# Patient Record
Sex: FEMALE | Race: WHITE | NOT HISPANIC OR LATINO
[De-identification: names, ages, dates, MRNs, and addresses within clinical notes are randomized per-mention and may not be internally consistent; named-entity substitution may affect disease eponyms.]

---

## 2018-11-08 PROBLEM — Z00.00 ENCOUNTER FOR PREVENTIVE HEALTH EXAMINATION: Status: ACTIVE | Noted: 2018-11-08

## 2018-12-24 ENCOUNTER — APPOINTMENT (OUTPATIENT)
Dept: OTOLARYNGOLOGY | Facility: CLINIC | Age: 53
End: 2018-12-24

## 2022-06-09 ENCOUNTER — NON-APPOINTMENT (OUTPATIENT)
Age: 57
End: 2022-06-09

## 2022-06-27 ENCOUNTER — APPOINTMENT (OUTPATIENT)
Dept: CARDIOTHORACIC SURGERY | Facility: CLINIC | Age: 57
End: 2022-06-27

## 2022-06-27 VITALS — BODY MASS INDEX: 35 KG/M2 | HEIGHT: 64 IN | WEIGHT: 205 LBS

## 2022-06-27 DIAGNOSIS — Z80.1 FAMILY HISTORY OF MALIGNANT NEOPLASM OF TRACHEA, BRONCHUS AND LUNG: ICD-10-CM

## 2022-06-27 DIAGNOSIS — Z87.891 PERSONAL HISTORY OF NICOTINE DEPENDENCE: ICD-10-CM

## 2022-06-27 PROCEDURE — G0296 VISIT TO DETERM LDCT ELIG: CPT

## 2022-06-28 NOTE — REASON FOR VISIT
[Initial Evaluation] : an initial evaluation visit [Review of Eligibility] : review of eligibility [Low-Dose CT Screening Discussion] : low-dose CT lung cancer screening discussion [Virtual Visit] : virtual visit [FreeTextEntry1] : Baseline LDCT

## 2022-07-05 ENCOUNTER — OUTPATIENT (OUTPATIENT)
Dept: OUTPATIENT SERVICES | Facility: HOSPITAL | Age: 57
LOS: 1 days | Discharge: HOME | End: 2022-07-05

## 2022-07-05 DIAGNOSIS — E78.00 PURE HYPERCHOLESTEROLEMIA, UNSPECIFIED: ICD-10-CM

## 2022-07-05 PROCEDURE — 71271 CT THORAX LUNG CANCER SCR C-: CPT | Mod: 26

## 2022-07-07 ENCOUNTER — NON-APPOINTMENT (OUTPATIENT)
Age: 57
End: 2022-07-07

## 2022-07-11 ENCOUNTER — TRANSCRIPTION ENCOUNTER (OUTPATIENT)
Age: 57
End: 2022-07-11

## 2022-07-14 ENCOUNTER — OUTPATIENT (OUTPATIENT)
Dept: OUTPATIENT SERVICES | Facility: HOSPITAL | Age: 57
LOS: 1 days | Discharge: HOME | End: 2022-07-14

## 2022-07-14 DIAGNOSIS — R07.9 CHEST PAIN, UNSPECIFIED: ICD-10-CM

## 2022-07-14 PROCEDURE — 75574 CT ANGIO HRT W/3D IMAGE: CPT | Mod: 26

## 2022-10-07 ENCOUNTER — OUTPATIENT (OUTPATIENT)
Dept: OUTPATIENT SERVICES | Facility: HOSPITAL | Age: 57
LOS: 1 days | Discharge: HOME | End: 2022-10-07

## 2022-10-07 DIAGNOSIS — E78.00 PURE HYPERCHOLESTEROLEMIA, UNSPECIFIED: ICD-10-CM

## 2022-10-07 PROCEDURE — 71250 CT THORAX DX C-: CPT | Mod: 26

## 2022-11-11 ENCOUNTER — OUTPATIENT (OUTPATIENT)
Dept: OUTPATIENT SERVICES | Facility: HOSPITAL | Age: 57
LOS: 1 days | Discharge: HOME | End: 2022-11-11

## 2022-11-11 DIAGNOSIS — R93.5 ABNORMAL FINDINGS ON DIAGNOSTIC IMAGING OF OTHER ABDOMINAL REGIONS, INCLUDING RETROPERITONEUM: ICD-10-CM

## 2022-11-11 LAB — GLUCOSE BLDC GLUCOMTR-MCNC: 92 MG/DL — SIGNIFICANT CHANGE UP (ref 70–99)

## 2022-11-11 PROCEDURE — 78815 PET IMAGE W/CT SKULL-THIGH: CPT | Mod: 26,PI

## 2023-01-09 ENCOUNTER — APPOINTMENT (OUTPATIENT)
Age: 58
End: 2023-01-09
Payer: COMMERCIAL

## 2023-01-09 VITALS
HEIGHT: 64 IN | WEIGHT: 201 LBS | OXYGEN SATURATION: 99 % | DIASTOLIC BLOOD PRESSURE: 80 MMHG | HEART RATE: 90 BPM | SYSTOLIC BLOOD PRESSURE: 124 MMHG | BODY MASS INDEX: 34.31 KG/M2

## 2023-01-09 PROCEDURE — 99205 OFFICE O/P NEW HI 60 MIN: CPT

## 2023-02-09 ENCOUNTER — LABORATORY RESULT (OUTPATIENT)
Age: 58
End: 2023-02-09

## 2023-02-09 ENCOUNTER — APPOINTMENT (OUTPATIENT)
Dept: HEMATOLOGY ONCOLOGY | Facility: CLINIC | Age: 58
End: 2023-02-09

## 2023-02-09 ENCOUNTER — OUTPATIENT (OUTPATIENT)
Dept: OUTPATIENT SERVICES | Facility: HOSPITAL | Age: 58
LOS: 1 days | End: 2023-02-09
Payer: COMMERCIAL

## 2023-02-09 ENCOUNTER — APPOINTMENT (OUTPATIENT)
Dept: HEMATOLOGY ONCOLOGY | Facility: CLINIC | Age: 58
End: 2023-02-09
Payer: COMMERCIAL

## 2023-02-09 VITALS
DIASTOLIC BLOOD PRESSURE: 80 MMHG | WEIGHT: 196 LBS | TEMPERATURE: 98.1 F | OXYGEN SATURATION: 98 % | RESPIRATION RATE: 14 BRPM | HEIGHT: 64 IN | BODY MASS INDEX: 33.46 KG/M2 | SYSTOLIC BLOOD PRESSURE: 144 MMHG | HEART RATE: 99 BPM

## 2023-02-09 DIAGNOSIS — C34.90 MALIGNANT NEOPLASM OF UNSPECIFIED PART OF UNSPECIFIED BRONCHUS OR LUNG: ICD-10-CM

## 2023-02-09 PROCEDURE — 99204 OFFICE O/P NEW MOD 45 MIN: CPT

## 2023-02-09 PROCEDURE — 82728 ASSAY OF FERRITIN: CPT

## 2023-02-09 PROCEDURE — 80053 COMPREHEN METABOLIC PANEL: CPT

## 2023-02-09 PROCEDURE — 85027 COMPLETE CBC AUTOMATED: CPT

## 2023-02-10 DIAGNOSIS — C34.90 MALIGNANT NEOPLASM OF UNSPECIFIED PART OF UNSPECIFIED BRONCHUS OR LUNG: ICD-10-CM

## 2023-02-10 LAB
ALBUMIN SERPL ELPH-MCNC: 4.7 G/DL
ALP BLD-CCNC: 117 U/L
ALT SERPL-CCNC: 22 U/L
ANION GAP SERPL CALC-SCNC: 9 MMOL/L
AST SERPL-CCNC: 21 U/L
BILIRUB SERPL-MCNC: <0.2 MG/DL
BUN SERPL-MCNC: 15 MG/DL
CALCIUM SERPL-MCNC: 9.7 MG/DL
CHLORIDE SERPL-SCNC: 100 MMOL/L
CO2 SERPL-SCNC: 28 MMOL/L
CREAT SERPL-MCNC: 0.9 MG/DL
EGFR: 74 ML/MIN/1.73M2
FERRITIN SERPL-MCNC: 64 NG/ML
GLUCOSE SERPL-MCNC: 89 MG/DL
HCT VFR BLD CALC: 37.8 %
HGB BLD-MCNC: 12.1 G/DL
MCHC RBC-ENTMCNC: 27.9 PG
MCHC RBC-ENTMCNC: 32 G/DL
MCV RBC AUTO: 87.1 FL
PLATELET # BLD AUTO: 368 K/UL
PMV BLD: 8.9 FL
POTASSIUM SERPL-SCNC: 4.6 MMOL/L
PROT SERPL-MCNC: 7.8 G/DL
RBC # BLD: 4.34 M/UL
RBC # FLD: 13 %
SODIUM SERPL-SCNC: 137 MMOL/L
WBC # FLD AUTO: 9.11 K/UL

## 2023-02-13 NOTE — ASSESSMENT
[FreeTextEntry1] : 58 year old former smoker with early stage non-small cell lung cancer ( adenocarcinoma , grade 2 )                        [id number] ) PDL1<1\par s/p left upper lobectomy . no adverse pathologic features.\par Plan : Natural history , prognosis and surveillance of resected early stage lung cancer  was discussed at length . \par There is no proven  benefit of adjuvant therapy in stage 1 disease in the absence of adverse clinical or pathologic features. \par she will return in 6 months with repeat CT scan .\par All questions and concerns were addressed.

## 2023-02-13 NOTE — HISTORY OF PRESENT ILLNESS
[de-identified] : 58 year old female , former smoker ( 25 pack year ) , goiter, found on screening CT scan in 7/2022  with BARBARA nodule 1.2 cm and a 2 mm BARBARA ground glass opacity . Follow up CT in 10/2022 showed increase in nodule to 1.5 cm . PET scan showed no increase in SUV . \par On 12/6/2022 she underwent Left upper lobectomy for 8 mm mass composed of adenocarcinoma , predominantly acinar , TTF 1 positive , PDL1<1 no visceral , pleural involvement or LVI , negative peribronchial lymph nodes . \par since surgery she complains of mild exertional dyspnea. no cough , bone pain , headaches . \par Health maintenance : up to date with mammogram and colonoscopy .

## 2023-02-17 ENCOUNTER — NON-APPOINTMENT (OUTPATIENT)
Age: 58
End: 2023-02-17

## 2023-02-27 ENCOUNTER — APPOINTMENT (OUTPATIENT)
Dept: PULMONOLOGY | Facility: CLINIC | Age: 58
End: 2023-02-27
Payer: COMMERCIAL

## 2023-02-27 VITALS
HEIGHT: 64 IN | OXYGEN SATURATION: 97 % | DIASTOLIC BLOOD PRESSURE: 84 MMHG | HEART RATE: 82 BPM | WEIGHT: 200 LBS | SYSTOLIC BLOOD PRESSURE: 112 MMHG | BODY MASS INDEX: 34.15 KG/M2

## 2023-02-27 PROCEDURE — 99214 OFFICE O/P EST MOD 30 MIN: CPT

## 2023-02-27 RX ORDER — ALBUTEROL SULFATE 90 UG/1
108 (90 BASE) INHALANT RESPIRATORY (INHALATION)
Qty: 1 | Refills: 1 | Status: ACTIVE | COMMUNITY
Start: 2023-02-27 | End: 1900-01-01

## 2023-03-01 NOTE — DISCUSSION/SUMMARY
[FreeTextEntry1] : Patient is here for follow-up.  She is doing fairly well.  She uses albuterol about 1 time per day.  Very upset about going to work does not feel she can do anything heavy which is reasonable I think given her a lobectomy at her age some of her request for time off a very reasonable especially since she does a lot of heavy lifting and works a lot of extra hours at times.\par \par She is awake alert oriented.  Her skin is warm and dry.  Her wounds continue to heal well.  Her chest is clear.  There is no clubbing cyanosis or edema.\par \par At this point Dr. Amador is following her and checking on the scans.  Her histology was adenocarcinoma.  I agree with the fact that she does not need adjuvant therapy.  I told her that a pulmonary function test sometime around 6 months after the surgery is appropriate.\par \par Some disability papers were completed for her.  Albuterol reordered.

## 2023-03-28 ENCOUNTER — APPOINTMENT (OUTPATIENT)
Age: 58
End: 2023-03-28
Payer: COMMERCIAL

## 2023-03-28 ENCOUNTER — APPOINTMENT (OUTPATIENT)
Dept: PULMONOLOGY | Facility: CLINIC | Age: 58
End: 2023-03-28
Payer: COMMERCIAL

## 2023-03-28 VITALS
SYSTOLIC BLOOD PRESSURE: 122 MMHG | WEIGHT: 199 LBS | HEART RATE: 91 BPM | BODY MASS INDEX: 33.97 KG/M2 | OXYGEN SATURATION: 97 % | HEIGHT: 64 IN | DIASTOLIC BLOOD PRESSURE: 78 MMHG

## 2023-03-28 PROCEDURE — 99213 OFFICE O/P EST LOW 20 MIN: CPT

## 2023-03-28 NOTE — PROCEDURE
[FreeTextEntry1] : Patient is doing well.  Awake alert oriented.  Skin is warm and dry.  Surgical scar healing well.  It is clear that [facility] is going to do the follow-up scans and continue to evaluate her.  She wishes to come him monthly which is okay with me.  She is awake alert oriented.  Skin is warm and dry.  No wheezing rales or rhonchi.  No CC or E.  Return here in approximately 1 month.  Skip a line time spent 25 minutes.

## 2023-04-17 ENCOUNTER — NON-APPOINTMENT (OUTPATIENT)
Age: 58
End: 2023-04-17

## 2023-04-28 ENCOUNTER — APPOINTMENT (OUTPATIENT)
Dept: PULMONOLOGY | Facility: CLINIC | Age: 58
End: 2023-04-28

## 2023-05-04 ENCOUNTER — APPOINTMENT (OUTPATIENT)
Dept: PULMONOLOGY | Facility: CLINIC | Age: 58
End: 2023-05-04
Payer: COMMERCIAL

## 2023-05-04 VITALS
DIASTOLIC BLOOD PRESSURE: 68 MMHG | RESPIRATION RATE: 14 BRPM | BODY MASS INDEX: 34.31 KG/M2 | SYSTOLIC BLOOD PRESSURE: 104 MMHG | HEART RATE: 95 BPM | OXYGEN SATURATION: 97 % | WEIGHT: 201 LBS | HEIGHT: 64 IN

## 2023-05-04 DIAGNOSIS — Z09 ENCOUNTER FOR FOLLOW-UP EXAMINATION AFTER COMPLETED TREATMENT FOR CONDITIONS OTHER THAN MALIGNANT NEOPLASM: ICD-10-CM

## 2023-05-04 PROCEDURE — 99214 OFFICE O/P EST MOD 30 MIN: CPT

## 2023-05-04 RX ORDER — UMECLIDINIUM BROMIDE AND VILANTEROL TRIFENATATE 62.5; 25 UG/1; UG/1
62.5-25 POWDER RESPIRATORY (INHALATION) DAILY
Qty: 1 | Refills: 5 | Status: ACTIVE | COMMUNITY
Start: 2023-05-04 | End: 1900-01-01

## 2023-05-04 NOTE — ASSESSMENT
[FreeTextEntry1] : Assessment:\par COPD \par status post lung resection for malignancy\par \par \par plan:\par Stress compliance with inhalers. Renewed today.\par cont PRN albuterol\par Add LAMA/LABA\par needs PFT when more stable\par weight loss\par CT chest in June\par \par F/U monthly I will jump on\par

## 2023-05-04 NOTE — HISTORY OF PRESENT ILLNESS
[TextBox_4] : I reviewed and interpreted any  OP CXR or CT available in the files\par I discussed the results today with the patient.\par \par

## 2023-05-04 NOTE — PHYSICAL EXAM
[No Acute Distress] : no acute distress [Normal Oropharynx] : normal oropharynx [Normal Appearance] : normal appearance [No Neck Mass] : no neck mass [Normal Rate/Rhythm] : normal rate/rhythm [Normal S1, S2] : normal s1, s2 [No Murmurs] : no murmurs [No Resp Distress] : no resp distress [No Abnormalities] : no abnormalities [Benign] : benign [Normal Gait] : normal gait [No Clubbing] : no clubbing [No Cyanosis] : no cyanosis [No Edema] : no edema [FROM] : FROM [Normal Color/ Pigmentation] : normal color/ pigmentation [No Focal Deficits] : no focal deficits [Oriented x3] : oriented x3 [Normal Affect] : normal affect [TextBox_68] : Mild decreased breath sounds

## 2023-05-04 NOTE — REASON FOR VISIT
[Follow-Up] : a follow-up visit [TextBox_44] : Patient usually followed by AKILA SEXTON.  She presents now for reevaluation.  Had a left upper lobe resection for malignancy in 12/2022.  She has enlarging nodule with a satellite lesion at the time.  There was biopsy positive.  She needs to have an open procedure due to the fact that she had significant adhesions.  Currently she states that she is about 50% back to normal.  She still gets very short of breath on minimal exertion.  She tries to go for walks but she gets very short of breath and frequently needs to stop.  She is taking her albuterol on a daily basis.  She is scheduled for CAT scan the end of June.

## 2023-06-28 ENCOUNTER — APPOINTMENT (OUTPATIENT)
Dept: PULMONOLOGY | Facility: CLINIC | Age: 58
End: 2023-06-28
Payer: COMMERCIAL

## 2023-06-28 VITALS
BODY MASS INDEX: 33.46 KG/M2 | WEIGHT: 196 LBS | RESPIRATION RATE: 14 BRPM | SYSTOLIC BLOOD PRESSURE: 122 MMHG | HEIGHT: 64 IN | HEART RATE: 74 BPM | OXYGEN SATURATION: 97 % | DIASTOLIC BLOOD PRESSURE: 80 MMHG

## 2023-06-28 PROCEDURE — 99213 OFFICE O/P EST LOW 20 MIN: CPT

## 2023-06-28 NOTE — REASON FOR VISIT
[Follow-Up] : a follow-up visit [Lung Cancer] : lung cancer [COPD] : COPD [Obesity] : obesity [TextBox_44] : Overall feeling somewhat better.  She feels the LAMA LABA did help to some extent.  She is following up at [place] after her surgery.  She recently had a CAT scan done that demonstrated stability in the previously identified nodules.  There was postsurgical changes in the left upper lobe.  She does feel quite short of breath with moderate exertion.  This is multifactorial.

## 2023-06-28 NOTE — ASSESSMENT
[FreeTextEntry1] : Assessment:\par COPD \par Dyspnea on exertion\par status post lung resection for malignancy\par \par \par plan:\par Stress compliance with inhalers. Renewed today.\par cont PRN albuterol\par Continue LAMA/LABA\par needs PFT when more stable we will wait to hear from [place] as to when this is appropriate from the standpoint\par weight loss\par CT chest in December 2023\par \par F/U monthly at her request\par

## 2023-07-13 ENCOUNTER — OUTPATIENT (OUTPATIENT)
Dept: OUTPATIENT SERVICES | Facility: HOSPITAL | Age: 58
LOS: 1 days | End: 2023-07-13
Payer: COMMERCIAL

## 2023-07-13 ENCOUNTER — LABORATORY RESULT (OUTPATIENT)
Age: 58
End: 2023-07-13

## 2023-07-13 ENCOUNTER — APPOINTMENT (OUTPATIENT)
Dept: HEMATOLOGY ONCOLOGY | Facility: CLINIC | Age: 58
End: 2023-07-13
Payer: COMMERCIAL

## 2023-07-13 VITALS
SYSTOLIC BLOOD PRESSURE: 133 MMHG | BODY MASS INDEX: 34.49 KG/M2 | DIASTOLIC BLOOD PRESSURE: 68 MMHG | HEIGHT: 64 IN | HEART RATE: 72 BPM | WEIGHT: 202 LBS

## 2023-07-13 DIAGNOSIS — C34.90 MALIGNANT NEOPLASM OF UNSPECIFIED PART OF UNSPECIFIED BRONCHUS OR LUNG: ICD-10-CM

## 2023-07-13 PROCEDURE — 99213 OFFICE O/P EST LOW 20 MIN: CPT

## 2023-07-13 PROCEDURE — 80053 COMPREHEN METABOLIC PANEL: CPT

## 2023-07-13 PROCEDURE — 85027 COMPLETE CBC AUTOMATED: CPT

## 2023-07-14 LAB
ALBUMIN SERPL ELPH-MCNC: 4.7 G/DL
ALP BLD-CCNC: 108 U/L
ALT SERPL-CCNC: 17 U/L
ANION GAP SERPL CALC-SCNC: 13 MMOL/L
AST SERPL-CCNC: 17 U/L
BILIRUB SERPL-MCNC: 0.3 MG/DL
BUN SERPL-MCNC: 16 MG/DL
CALCIUM SERPL-MCNC: 9.7 MG/DL
CHLORIDE SERPL-SCNC: 104 MMOL/L
CO2 SERPL-SCNC: 25 MMOL/L
CREAT SERPL-MCNC: 0.8 MG/DL
EGFR: 85 ML/MIN/1.73M2
GLUCOSE SERPL-MCNC: 84 MG/DL
HCT VFR BLD CALC: 37.6 %
HGB BLD-MCNC: 12.2 G/DL
MCHC RBC-ENTMCNC: 28.2 PG
MCHC RBC-ENTMCNC: 32.4 G/DL
MCV RBC AUTO: 87 FL
PLATELET # BLD AUTO: 273 K/UL
PMV BLD: 8.8 FL
POTASSIUM SERPL-SCNC: 4.9 MMOL/L
PROT SERPL-MCNC: 6.8 G/DL
RBC # BLD: 4.32 M/UL
RBC # FLD: 13.7 %
SODIUM SERPL-SCNC: 142 MMOL/L
WBC # FLD AUTO: 8.33 K/UL

## 2023-07-14 NOTE — HISTORY OF PRESENT ILLNESS
[de-identified] : 58 year old female , former smoker ( 25 pack year ) , goiter, found on screening CT scan in 7/2022  with BARBARA nodule 1.2 cm and a 2 mm BARBARA ground glass opacity . Follow up CT in 10/2022 showed increase in nodule to 1.5 cm . PET scan showed no increase in SUV . \par On 12/6/2022 she underwent Left upper lobectomy for 8 mm mass composed of adenocarcinoma , predominantly acinar , TTF 1 positive , PDL1<1 no visceral , pleural involvement or LVI , negative peribronchial lymph nodes . \par since surgery she complains of mild exertional dyspnea. no cough , bone pain , headaches . \par Health maintenance : up to date with mammogram and colonoscopy .  [de-identified] : 7/13/2023 Patient returns for follow up .

## 2023-08-29 ENCOUNTER — APPOINTMENT (OUTPATIENT)
Dept: PULMONOLOGY | Facility: CLINIC | Age: 58
End: 2023-08-29
Payer: COMMERCIAL

## 2023-08-29 PROCEDURE — 99213 OFFICE O/P EST LOW 20 MIN: CPT

## 2023-08-29 NOTE — REASON FOR VISIT
[Follow-Up] : a follow-up visit [Lung Cancer] : lung cancer [COPD] : COPD [TextBox_44] : History of COPD lung cancer with lung resection.  Starting to have more more trouble.  She needs to sell her house because it is too big to take care of.  She is developing more shortness of breath on less exertion.  She is trying to lose weight.  She has difficulty getting about doing her routine ADLs.  She definitely feels this whole process is catching up with her.

## 2023-08-29 NOTE — ASSESSMENT
[FreeTextEntry1] : Assessment: COPD  Dyspnea on exertion slowly worsening over time status post lung resection for malignancy   plan: Stress compliance with inhalers. Renewed today. cont PRN albuterol Continue LAMA/LABA consider adding ICS needs PFT when more stable we will wait to hear from [place] as to when this is appropriate from the standpoint weight loss CT chest in June 2023 without change  F/U few months

## 2023-10-24 ENCOUNTER — APPOINTMENT (OUTPATIENT)
Dept: PULMONOLOGY | Facility: CLINIC | Age: 58
End: 2023-10-24
Payer: COMMERCIAL

## 2023-10-24 VITALS
SYSTOLIC BLOOD PRESSURE: 122 MMHG | DIASTOLIC BLOOD PRESSURE: 80 MMHG | WEIGHT: 196 LBS | BODY MASS INDEX: 33.46 KG/M2 | OXYGEN SATURATION: 97 % | HEIGHT: 64 IN | HEART RATE: 70 BPM

## 2023-10-24 PROCEDURE — 99214 OFFICE O/P EST MOD 30 MIN: CPT

## 2023-10-30 ENCOUNTER — OUTPATIENT (OUTPATIENT)
Dept: OUTPATIENT SERVICES | Facility: HOSPITAL | Age: 58
LOS: 1 days | End: 2023-10-30
Payer: COMMERCIAL

## 2023-10-30 ENCOUNTER — APPOINTMENT (OUTPATIENT)
Dept: PULMONOLOGY | Facility: HOSPITAL | Age: 58
End: 2023-10-30
Payer: COMMERCIAL

## 2023-10-30 DIAGNOSIS — J44.9 CHRONIC OBSTRUCTIVE PULMONARY DISEASE, UNSPECIFIED: ICD-10-CM

## 2023-10-30 PROCEDURE — 94664 DEMO&/EVAL PT USE INHALER: CPT

## 2023-10-30 PROCEDURE — 94060 EVALUATION OF WHEEZING: CPT | Mod: 26

## 2023-10-30 PROCEDURE — 94727 GAS DIL/WSHOT DETER LNG VOL: CPT | Mod: 26

## 2023-10-30 PROCEDURE — 94727 GAS DIL/WSHOT DETER LNG VOL: CPT

## 2023-10-30 PROCEDURE — 94729 DIFFUSING CAPACITY: CPT | Mod: 26

## 2023-10-30 PROCEDURE — 94070 EVALUATION OF WHEEZING: CPT

## 2023-10-30 PROCEDURE — 94729 DIFFUSING CAPACITY: CPT

## 2023-10-31 DIAGNOSIS — J44.9 CHRONIC OBSTRUCTIVE PULMONARY DISEASE, UNSPECIFIED: ICD-10-CM

## 2024-01-02 ENCOUNTER — APPOINTMENT (OUTPATIENT)
Dept: PULMONOLOGY | Facility: CLINIC | Age: 59
End: 2024-01-02
Payer: COMMERCIAL

## 2024-01-02 VITALS
DIASTOLIC BLOOD PRESSURE: 82 MMHG | WEIGHT: 198 LBS | OXYGEN SATURATION: 97 % | HEIGHT: 64 IN | HEART RATE: 79 BPM | SYSTOLIC BLOOD PRESSURE: 124 MMHG | BODY MASS INDEX: 33.8 KG/M2

## 2024-01-02 DIAGNOSIS — R91.8 OTHER NONSPECIFIC ABNORMAL FINDING OF LUNG FIELD: ICD-10-CM

## 2024-01-02 PROCEDURE — 99213 OFFICE O/P EST LOW 20 MIN: CPT

## 2024-01-02 NOTE — HISTORY OF PRESENT ILLNESS
[TextBox_4] : I reviewed and interpreted any  OP CXR or CT available in the files I discussed the results today with the patient. I reviewed the original evaluation and last notes.

## 2024-01-02 NOTE — ASSESSMENT
[FreeTextEntry1] : Assessment: COPD stable: Multiple subcentimeter pulmonary nodule plan: Stress compliance with inhalers. Renewed today. cont PRN albuterol cont ICS/LABA   PFT reviewed with the patient weight loss CT chest will be repeated in about 6 months /no further smoking was discussed with the patient F/U 6 months

## 2024-01-02 NOTE — REASON FOR VISIT
[Follow-Up] : a follow-up visit [Lung Cancer] : lung cancer [COPD] : COPD [TextBox_44] : History of COPD lung resection for cancer doing pretty well.  She remains very active.  She recently had a screening CAT scan which shows stability of her lung nodules.  There has not been any changes.  She is scheduled to repeat in another 6 months.  Her lung surgery was about a year ago.  She is taking her inhalers as directed.

## 2024-02-15 ENCOUNTER — APPOINTMENT (OUTPATIENT)
Dept: ENDOCRINOLOGY | Facility: CLINIC | Age: 59
End: 2024-02-15
Payer: COMMERCIAL

## 2024-02-15 DIAGNOSIS — E04.2 NONTOXIC MULTINODULAR GOITER: ICD-10-CM

## 2024-02-15 DIAGNOSIS — Z86.59 PERSONAL HISTORY OF OTHER MENTAL AND BEHAVIORAL DISORDERS: ICD-10-CM

## 2024-02-15 DIAGNOSIS — Z82.49 FAMILY HISTORY OF ISCHEMIC HEART DISEASE AND OTHER DISEASES OF THE CIRCULATORY SYSTEM: ICD-10-CM

## 2024-02-15 DIAGNOSIS — E55.9 VITAMIN D DEFICIENCY, UNSPECIFIED: ICD-10-CM

## 2024-02-15 PROCEDURE — 99203 OFFICE O/P NEW LOW 30 MIN: CPT

## 2024-02-15 RX ORDER — MELOXICAM 15 MG/1
15 TABLET ORAL
Refills: 0 | Status: ACTIVE | COMMUNITY

## 2024-02-16 NOTE — HISTORY OF PRESENT ILLNESS
[FreeTextEntry1] : 59 year old lady with history of lung ca s/p lobectomy who present for evaluation of thyroid nodules    # thyroid nodules  - diagnosed many years ago , was followed by Dr nava and was stable  - no recent thyroid US or tfts  - clinically euthyroid  - no radiation to the neck , or compressive symptoms

## 2024-02-16 NOTE — ASSESSMENT
[FreeTextEntry1] : 59 year old lady who present for evaluation of thyroid nodules    # thyroid nodules  - diagnosed many years ago , was followed by Dr nava and was stable  - no recent thyroid US or tfts  - clinically euthyroid  - no radiation to the neck , or compressive symptoms   repeat thyroid US now and get record , check tfts

## 2024-02-16 NOTE — PHYSICAL EXAM
[Alert] : alert [Healthy Appearance] : healthy appearance [No Acute Distress] : no acute distress [No Proptosis] : no proptosis [No Lid Lag] : no lid lag [Thyroid Not Enlarged] : the thyroid was not enlarged [No Respiratory Distress] : no respiratory distress [No Accessory Muscle Use] : no accessory muscle use [Clear to Auscultation] : lungs were clear to auscultation bilaterally [Normal S1, S2] : normal S1 and S2 [No Murmurs] : no murmurs [Regular Rhythm] : with a regular rhythm [No Edema] : no peripheral edema [No Stigmata of Cushings Syndrome] : no stigmata of Cushings Syndrome [No Tremors] : no tremors [Oriented x3] : oriented to person, place, and time [de-identified] : nodular

## 2024-02-16 NOTE — REVIEW OF SYSTEMS
[Fatigue] : fatigue [Dysphagia] : no dysphagia [Dysphonia] : no dysphonia [Chest Pain] : no chest pain [Palpitations] : no palpitations [As Noted in HPI] : as noted in HPI [All other systems negative] : All other systems negative

## 2024-02-23 ENCOUNTER — OUTPATIENT (OUTPATIENT)
Dept: OUTPATIENT SERVICES | Facility: HOSPITAL | Age: 59
LOS: 1 days | End: 2024-02-23
Payer: COMMERCIAL

## 2024-02-23 DIAGNOSIS — E04.2 NONTOXIC MULTINODULAR GOITER: ICD-10-CM

## 2024-02-23 PROCEDURE — 86800 THYROGLOBULIN ANTIBODY: CPT

## 2024-02-23 PROCEDURE — 84443 ASSAY THYROID STIM HORMONE: CPT

## 2024-02-23 PROCEDURE — 82306 VITAMIN D 25 HYDROXY: CPT

## 2024-02-23 PROCEDURE — 84439 ASSAY OF FREE THYROXINE: CPT

## 2024-02-24 DIAGNOSIS — E04.2 NONTOXIC MULTINODULAR GOITER: ICD-10-CM

## 2024-02-25 LAB
25(OH)D3 SERPL-MCNC: 34 NG/ML
T4 FREE SERPL-MCNC: 1.1 NG/DL
THYROGLOB AB SERPL-ACNC: <20 IU/ML
THYROPEROXIDASE AB SERPL IA-ACNC: <10 IU/ML
TSH SERPL-ACNC: 1.97 UIU/ML

## 2024-04-10 ENCOUNTER — APPOINTMENT (OUTPATIENT)
Dept: PULMONOLOGY | Facility: CLINIC | Age: 59
End: 2024-04-10
Payer: COMMERCIAL

## 2024-04-10 VITALS
BODY MASS INDEX: 33.29 KG/M2 | SYSTOLIC BLOOD PRESSURE: 112 MMHG | HEIGHT: 64 IN | OXYGEN SATURATION: 98 % | WEIGHT: 195 LBS | HEART RATE: 97 BPM | DIASTOLIC BLOOD PRESSURE: 62 MMHG

## 2024-04-10 DIAGNOSIS — C34.90 MALIGNANT NEOPLASM OF UNSPECIFIED PART OF UNSPECIFIED BRONCHUS OR LUNG: ICD-10-CM

## 2024-04-10 DIAGNOSIS — J44.9 CHRONIC OBSTRUCTIVE PULMONARY DISEASE, UNSPECIFIED: ICD-10-CM

## 2024-04-10 PROCEDURE — 99213 OFFICE O/P EST LOW 20 MIN: CPT

## 2024-04-10 PROCEDURE — G2211 COMPLEX E/M VISIT ADD ON: CPT

## 2024-04-10 NOTE — ASSESSMENT
[FreeTextEntry1] : Assessment: COPD stable: Multiple subcentimeter pulmonary nodule plan: Stress compliance with inhalers. Renewed today. cont PRN albuterol cont ICS/LABA we have elected to continue with current medications   PFT reviewed with the patient weight loss CT chest was repeated without any specific changes no further smoking was discussed with the patient F/U 6 months

## 2024-04-10 NOTE — REASON FOR VISIT
[Follow-Up] : a follow-up visit [COPD] : COPD [TextBox_44] : Doing well in general not having any specific problems using her medications regularly.  We had a long discussion regarding her particular brand of medication.  The other medications are not covered or cause her symptoms.

## 2024-08-02 ENCOUNTER — RX RENEWAL (OUTPATIENT)
Age: 59
End: 2024-08-02

## 2024-09-09 ENCOUNTER — APPOINTMENT (OUTPATIENT)
Dept: PULMONOLOGY | Facility: CLINIC | Age: 59
End: 2024-09-09
Payer: COMMERCIAL

## 2024-09-09 VITALS
HEART RATE: 84 BPM | HEIGHT: 64 IN | OXYGEN SATURATION: 98 % | DIASTOLIC BLOOD PRESSURE: 84 MMHG | BODY MASS INDEX: 32.44 KG/M2 | WEIGHT: 190 LBS | SYSTOLIC BLOOD PRESSURE: 122 MMHG

## 2024-09-09 DIAGNOSIS — Z09 ENCOUNTER FOR FOLLOW-UP EXAMINATION AFTER COMPLETED TREATMENT FOR CONDITIONS OTHER THAN MALIGNANT NEOPLASM: ICD-10-CM

## 2024-09-09 DIAGNOSIS — J44.9 CHRONIC OBSTRUCTIVE PULMONARY DISEASE, UNSPECIFIED: ICD-10-CM

## 2024-09-09 DIAGNOSIS — R91.8 OTHER NONSPECIFIC ABNORMAL FINDING OF LUNG FIELD: ICD-10-CM

## 2024-09-09 DIAGNOSIS — C34.90 MALIGNANT NEOPLASM OF UNSPECIFIED PART OF UNSPECIFIED BRONCHUS OR LUNG: ICD-10-CM

## 2024-09-09 PROCEDURE — G2211 COMPLEX E/M VISIT ADD ON: CPT | Mod: NC

## 2024-09-09 PROCEDURE — 99214 OFFICE O/P EST MOD 30 MIN: CPT

## 2024-09-09 NOTE — HISTORY OF PRESENT ILLNESS
[TextBox_4] : Subjective: - Summary : Patient is here for a check-up two years post a surgical procedure. Patient is also discussing with the provider their experience and symptoms during their recovery period.  She reported an improvement in her condition and is continuing with her normal activities, stating 'I know my limitations'. She had to take frequent breaks while performing activities.  Patient Ne Mary, a non-smoker of about 15 years, had undergone a lung resection 2 years ago in December 2020. Presently, she is following up with regular check-ups and scans every 6 months. The scans have been good so far, and she has another one scheduled for December. She expressed that she has some difficulty performing activities continuously and that she had to take breaks frequently. She said she knows her limitations, has been more active, and has taken frequent walks with her dogs.  - Family History : The patient mentioned that her father had a similar condition, hence regular scans might be due to her perceived family predisposition. - Social History : - Patient is active; she walks her dogs regularly.  - She had recently packed up and sold her house.

## 2024-09-09 NOTE — ASSESSMENT
[FreeTextEntry1] : Assessment and Plan: - Post-surgical recovery : The patient is two years post-surgical intervention and she is recovering well. She is more active now and is aware of her physical limitations.  - Continued close monitoring with regular CAT scans.by her surgeon  - Medication review and refill as necessary.  - Encourage patient to maintain activity as tolerated.  f/u 6 months

## 2025-03-11 ENCOUNTER — APPOINTMENT (OUTPATIENT)
Dept: PULMONOLOGY | Facility: CLINIC | Age: 60
End: 2025-03-11
Payer: COMMERCIAL

## 2025-03-11 VITALS
HEIGHT: 64 IN | DIASTOLIC BLOOD PRESSURE: 82 MMHG | HEART RATE: 98 BPM | OXYGEN SATURATION: 97 % | SYSTOLIC BLOOD PRESSURE: 124 MMHG | BODY MASS INDEX: 33.29 KG/M2 | WEIGHT: 195 LBS

## 2025-03-11 DIAGNOSIS — J44.9 CHRONIC OBSTRUCTIVE PULMONARY DISEASE, UNSPECIFIED: ICD-10-CM

## 2025-03-11 DIAGNOSIS — R91.8 OTHER NONSPECIFIC ABNORMAL FINDING OF LUNG FIELD: ICD-10-CM

## 2025-03-11 DIAGNOSIS — C34.90 MALIGNANT NEOPLASM OF UNSPECIFIED PART OF UNSPECIFIED BRONCHUS OR LUNG: ICD-10-CM

## 2025-03-11 PROCEDURE — 99213 OFFICE O/P EST LOW 20 MIN: CPT

## 2025-03-11 PROCEDURE — G2211 COMPLEX E/M VISIT ADD ON: CPT | Mod: NC

## 2025-09-15 ENCOUNTER — APPOINTMENT (OUTPATIENT)
Dept: PULMONOLOGY | Facility: CLINIC | Age: 60
End: 2025-09-15